# Patient Record
Sex: MALE | Race: WHITE | NOT HISPANIC OR LATINO | Employment: UNEMPLOYED | URBAN - METROPOLITAN AREA
[De-identification: names, ages, dates, MRNs, and addresses within clinical notes are randomized per-mention and may not be internally consistent; named-entity substitution may affect disease eponyms.]

---

## 2017-02-08 ENCOUNTER — OFFICE VISIT (OUTPATIENT)
Dept: NEUROSURGERY | Facility: CLINIC | Age: 67
End: 2017-02-08
Payer: COMMERCIAL

## 2017-02-08 VITALS
DIASTOLIC BLOOD PRESSURE: 85 MMHG | HEIGHT: 66 IN | SYSTOLIC BLOOD PRESSURE: 149 MMHG | WEIGHT: 180 LBS | HEART RATE: 79 BPM | BODY MASS INDEX: 28.93 KG/M2

## 2017-02-08 DIAGNOSIS — M54.41 LUMBAGO WITH SCIATICA, RIGHT SIDE: Primary | ICD-10-CM

## 2017-02-08 DIAGNOSIS — M54.2 CERVICALGIA: ICD-10-CM

## 2017-02-08 DIAGNOSIS — Z86.010 HX OF COLONIC POLYPS: ICD-10-CM

## 2017-02-08 PROCEDURE — 99215 OFFICE O/P EST HI 40 MIN: CPT | Mod: S$GLB,,, | Performed by: NEUROLOGICAL SURGERY

## 2017-02-08 RX ORDER — IBUPROFEN 200 MG
200 TABLET ORAL EVERY 6 HOURS PRN
Status: ON HOLD | COMMUNITY
End: 2017-08-18 | Stop reason: HOSPADM

## 2017-02-08 RX ORDER — ACETAMINOPHEN 325 MG/1
325 TABLET ORAL EVERY 6 HOURS PRN
COMMUNITY

## 2017-02-08 NOTE — MR AVS SNAPSHOT
[facility] Neurosurgery  [address]  [facility] [address]  Phone: [phone]  Fax: [phone]                  Tian Magana   2017 10:30 AM   Office Visit    Description:  Male : 1950   Provider:  Adam Acosta MD   Department:  [place] - Neurosurgery           Reason for Visit     Follow-up           Diagnoses this Visit        Comments    Lumbago with sciatica, right side    -  Primary     Hx of colonic polyps         Cervicalgia                To Do List           Goals (5 Years of Data)     None      [facility]     [facility] -  Assistance  Registered nurses in the [facility] provide clinical advisement, health education, appointment booking, and other advisory services.  Call for this free service at [phone].             Medications           Message regarding Medications     Verify the changes and/or additions to your medication regime listed below are the same as discussed with your clinician today.  If any of these changes or additions are incorrect, please notify your healthcare provider.             Verify that the below list of medications is an accurate representation of the medications you are currently taking.  If none reported, the list may be blank. If incorrect, please contact your healthcare provider. Carry this list with you in case of emergency.           Current Medications     acetaminophen (TYLENOL) 325 MG tablet Take 325 mg by mouth every 6 (six) hours as needed for Pain.    ascorbic acid (VITAMIN C) 100 MG tablet Take 100 mg by mouth once daily.    aspirin (ECOTRIN) 81 MG EC tablet Take 81 mg by mouth once daily.    atorvastatin (LIPITOR) 40 MG tablet Take 40 mg by mouth once daily.    calcium-vitamin D (OSCAL) 250 (625)-125 mg-unit per tablet Take 1 tablet by mouth once daily.    clonazePAM (KLONOPIN) 0.5 MG tablet Take 0.5 mg by mouth 2 (two) times daily as needed for Anxiety.    cyanocobalamin  "(VITAMIN B-12) 1000 MCG tablet Take 100 mcg by mouth once daily.    flunisolide 29 mcg (0.025 %) Spry by Nasal route.    ibuprofen (ADVIL) 200 MG tablet Take 200 mg by mouth every 6 (six) hours as needed for Pain.    lisinopril (PRINIVIL,ZESTRIL) 40 MG tablet Take 40 mg by mouth once daily.    loratadine (CLARITIN) 10 mg tablet Take 10 mg by mouth once daily.    omega-3 fatty acids-vitamin E 1,000 mg Cap Take by mouth.    ranitidine (ZANTAC) 150 MG tablet Take 150 mg by mouth 2 (two) times daily.    sertraline (ZOLOFT) 100 MG tablet Take 100 mg by mouth once daily.    sildenafil (VIAGRA) 100 MG tablet Take 100 mg by mouth daily as needed for Erectile Dysfunction.    simethicone (MYLICON) 80 MG chewable tablet Take 80 mg by mouth every 6 (six) hours as needed for Flatulence.    trazodone (DESYREL) 100 MG tablet Take 100 mg by mouth every evening.    gabapentin (NEURONTIN) 300 MG capsule Take 300 mg by mouth 3 (three) times daily.           Clinical Reference Information           Your Vitals Were     BP Pulse Height Weight BMI    149/85 79 5' 6" (1.676 m) 81.6 kg (180 lb) 29.05 kg/m2      Blood Pressure          Most Recent Value    BP  (!)  149/85      Allergies as of 2/8/2017     Sulfa (Sulfonamide Antibiotics)      Immunizations Administered on Date of Encounter - 2/8/2017     None      [facility] Sign-Up     Activating your [facility] account is as easy as 1-2-3!     1) Visit [email or web address], select Sign Up Now, enter this activation code and your date of birth, then select Next.  [email or web address]  Expires: 3/25/2017 12:40 PM      2) Create a username and password to use when you visit [facility] in the future and select a security question in case you lose your password and select Next.    3) Enter your e-mail address and click Sign Up!    Additional Information  If you have questions, please e-mail [email or web address] or call [phone] to talk to our [facility] staff. Remember, [facility] is NOT to be used for " urgent needs. For medical emergencies, dial 911.         Language Assistance Services     ATTENTION: Language assistance services are available, free of charge. Please call [phone].      ATENCIÓN: Si habla ibrahima, tiene a lipscomb disposición servicios gratuitos de asistencia lingüística. Llame al [phone].     CHÚ Ý: N?u b?n nói Ti?ng Vi?t, có các d?ch v? h? tr? ngôn ng? mi?n phí dành cho b?n. G?i s? [phone].         [facility] complies with applicable Federal civil rights laws and does not discriminate on the basis of race, color, national origin, age, disability, or sex.

## 2017-02-08 NOTE — PROGRESS NOTES
Neurosurgery History and Physical    Patient ID: Tian Magana is a 66 y.o. male.    Chief Complaint   Patient presents with    Follow-up     LBP f/u did not get injections. states no change in condition.      The patient is a 66 year old  male previously seen by my NP and by Dr Campos. He reports chronic LBP with intermittent flare-ups exacerbated by physical activity factors. He was advised to try MARLEN by Dr Campos but the patient decided not to pursue these as he has friends who suffered nerve injury from such injectio.    He has pain radiating down the right later leg to the mid calf. Separately he reports neck pain radiating to the left shoulder and to the left side of the occiput. EMG/NCT of the LUE showed some C5 radiculopathy and evidence and carpal and cubital tunnel syndrome. He has had occasional left finger numbness but this has not been an issue recently. No weakness or bowel/bladder dysfunction. He has tried PT with minimal benefit.         Review of Systems   Constitutional: Negative.    HENT: Negative.    Eyes: Negative.    Respiratory: Negative.    Cardiovascular: Negative.    Gastrointestinal: Negative.    Endocrine: Negative.    Genitourinary: Negative.    Musculoskeletal: Positive for back pain, myalgias, neck pain and neck stiffness.   Skin: Negative.    Allergic/Immunologic: Negative.    Neurological: Positive for numbness. Negative for weakness.   Hematological: Negative.    Psychiatric/Behavioral: Negative.        Past Medical History   Diagnosis Date    Anticoagulant long-term use      Aspirin    Hypertension      Social History     Social History    Marital status:      Spouse name: N/A    Number of children: N/A    Years of education: N/A     Occupational History    Not on file.     Social History Main Topics    Smoking status: Never Smoker    Smokeless tobacco: Never Used    Alcohol use 0.0 oz/week     0 Standard drinks or equivalent per week       Comment: 2-3 beers 4 times/week    Drug use: No    Sexual activity: Not on file     Other Topics Concern    Not on file     Social History Narrative     No family history on file.  Review of patient's allergies indicates:   Allergen Reactions    Sulfa (sulfonamide antibiotics)        Current Outpatient Prescriptions:     acetaminophen (TYLENOL) 325 MG tablet, Take 325 mg by mouth every 6 (six) hours as needed for Pain., Disp: , Rfl:     ascorbic acid (VITAMIN C) 100 MG tablet, Take 100 mg by mouth once daily., Disp: , Rfl:     aspirin (ECOTRIN) 81 MG EC tablet, Take 81 mg by mouth once daily., Disp: , Rfl:     atorvastatin (LIPITOR) 40 MG tablet, Take 40 mg by mouth once daily., Disp: , Rfl:     calcium-vitamin D (OSCAL) 250 (625)-125 mg-unit per tablet, Take 1 tablet by mouth once daily., Disp: , Rfl:     clonazePAM (KLONOPIN) 0.5 MG tablet, Take 0.5 mg by mouth 2 (two) times daily as needed for Anxiety., Disp: , Rfl:     cyanocobalamin (VITAMIN B-12) 1000 MCG tablet, Take 100 mcg by mouth once daily., Disp: , Rfl:     flunisolide 29 mcg (0.025 %) Spry, by Nasal route., Disp: , Rfl:     ibuprofen (ADVIL) 200 MG tablet, Take 200 mg by mouth every 6 (six) hours as needed for Pain., Disp: , Rfl:     lisinopril (PRINIVIL,ZESTRIL) 40 MG tablet, Take 40 mg by mouth once daily., Disp: , Rfl:     loratadine (CLARITIN) 10 mg tablet, Take 10 mg by mouth once daily., Disp: , Rfl:     omega-3 fatty acids-vitamin E 1,000 mg Cap, Take by mouth., Disp: , Rfl:     ranitidine (ZANTAC) 150 MG tablet, Take 150 mg by mouth 2 (two) times daily., Disp: , Rfl:     sertraline (ZOLOFT) 100 MG tablet, Take 100 mg by mouth once daily., Disp: , Rfl:     sildenafil (VIAGRA) 100 MG tablet, Take 100 mg by mouth daily as needed for Erectile Dysfunction., Disp: , Rfl:     simethicone (MYLICON) 80 MG chewable tablet, Take 80 mg by mouth every 6 (six) hours as needed for Flatulence., Disp: , Rfl:     trazodone (DESYREL) 100 MG  "tablet, Take 100 mg by mouth every evening., Disp: , Rfl:     gabapentin (NEURONTIN) 300 MG capsule, Take 300 mg by mouth 3 (three) times daily., Disp: , Rfl:   Blood pressure (!) 149/85, pulse 79, height 5' 6" (1.676 m), weight 81.6 kg (180 lb).      Neurologic Exam     Mental Status   Oriented to person, place, and time.   Attention: normal. Concentration: normal.   Speech: speech is normal   Level of consciousness: alert  Knowledge: good.     Cranial Nerves     CN III, IV, VI   Pupils are equal, round, and reactive to light.  Extraocular motions are normal.     CN V   Facial sensation intact.     CN VII   Facial expression full, symmetric.     CN VIII   Hearing: impaired    CN IX, X   Palate: symmetric    CN XI   CN XI normal.     CN XII   CN XII normal.     Motor Exam   Muscle bulk: normal  Overall muscle tone: normal  Right arm tone: normal  Left arm tone: normal  Right arm pronator drift: absent  Right leg tone: normal  Left leg tone: normal    Strength   Right neck flexion: 5/5  Left neck flexion: 5/5  Right neck extension: 5/5  Left neck extension: 5/5  Right deltoid: 5/5  Left deltoid: 5/5  Right biceps: 5/5  Left biceps: 5/5  Right triceps: 5/5  Left triceps: 5/5  Right wrist flexion: 5/5  Left wrist flexion: 5/5  Right wrist extension: 5/5  Left wrist extension: 5/5  Right interossei: 5/5  Left interossei: 5/5  Right abdominals: 5/5  Left abdominals: 5/5  Right iliopsoas: 5/5  Left iliopsoas: 5/5  Right quadriceps: 5/5  Left quadriceps: 5/5  Right hamstrin/5  Left hamstrin/5  Right glutei: 5/5  Left glutei: 5/5  Right anterior tibial: 5/5  Left anterior tibial: 5/5  Right posterior tibial: 5/5  Left posterior tibial: 5/5  Right peroneal: 5/5  Left peroneal: 5/5  Right gastroc: 5/5  Left gastroc: 5/5    Sensory Exam   Right arm light touch: normal  Left arm light touch: normal  Right leg light touch: decreased from knee (some numbness to LT in lateral femoral cutaneous distribution)  Left leg " light touch: normal  Right leg vibration: decreased from knee  Right leg proprioception: decreased from knee  Sensory deficit distribution on right: L3    Gait, Coordination, and Reflexes     Gait  Gait: normal    Coordination   Finger to nose coordination: normal    Tremor   Resting tremor: absent    Reflexes   Right brachioradialis: 2+  Left brachioradialis: 2+  Right biceps: 2+  Left biceps: 2+  Right triceps: 2+  Left triceps: 2+  Right patellar: 2+  Left patellar: 2+  Right achilles: 2+  Left achilles: 2+  Right : 2+  Left : 2+  Right plantar: normal  Left plantar: normal  Right Menard: absent  Left Menard: absent  Right ankle clonus: absent  Left ankle clonus: absent      Physical Exam   Constitutional: He is oriented to person, place, and time. He appears well-developed and well-nourished.   HENT:   Head: Normocephalic and atraumatic.   Eyes: EOM are normal. Pupils are equal, round, and reactive to light.   Neck: Neck supple.   Cardiovascular: Normal rate and regular rhythm.    Pulmonary/Chest: Effort normal.   Abdominal: Soft.   Musculoskeletal: Normal range of motion.   Neurological: He is alert and oriented to person, place, and time. He has a normal Finger-Nose-Finger Test. Gait normal.   Reflex Scores:       Tricep reflexes are 2+ on the right side and 2+ on the left side.       Bicep reflexes are 2+ on the right side and 2+ on the left side.       Brachioradialis reflexes are 2+ on the right side and 2+ on the left side.       Patellar reflexes are 2+ on the right side and 2+ on the left side.       Achilles reflexes are 2+ on the right side and 2+ on the left side.  Skin: Skin is warm and dry.   Psychiatric: He has a normal mood and affect. His speech is normal and behavior is normal. Judgment and thought content normal.   Vitals reviewed.      Provider dictation:    The patient has continued pain rated 7/10 and he only has mild sensory loss. I have reviewed again his MRIs of the C and L  spines from July 2016. He has mild spondylotic changes but no clear compression of the neral elements. The lumbar MRI shows congenital narrowing of the spinal canal, with ligamentous and synovial hypertrophy causing multilevel stenosis at worse at L4-L5 and L5-S1.There is an acute far lateral annular tear at L3-L4 on the right.   His back pain is causing moderate impairment.    His current level of impairment would justify decompression and fusion but his mild deficits make the surgery non-urgent. We will offer surgery if his symptoms worsen.    Visit Diagnosis:  Lumbago with sciatica, right side    Hx of colonic polyps    Cervicalgia

## 2017-07-26 DIAGNOSIS — M48.061 LUMBAR STENOSIS: Primary | ICD-10-CM

## 2017-07-26 RX ORDER — SODIUM CHLORIDE 9 MG/ML
INJECTION, SOLUTION INTRAVENOUS CONTINUOUS
Status: CANCELLED | OUTPATIENT
Start: 2017-07-26

## 2017-08-15 PROBLEM — M48.061 LUMBAR STENOSIS: Status: ACTIVE | Noted: 2017-08-15

## 2017-08-28 ENCOUNTER — CLINICAL SUPPORT (OUTPATIENT)
Dept: NEUROSURGERY | Facility: CLINIC | Age: 67
End: 2017-08-28
Payer: COMMERCIAL

## 2017-08-28 DIAGNOSIS — Z98.1 STATUS POST LUMBAR SPINAL FUSION: Primary | ICD-10-CM

## 2017-08-28 NOTE — PROGRESS NOTES
Pt is 12 days s/p L3-S1 PSIF with Dr. Acosta. No s/s of infection. Incision cleaned with chloraprep and staples removed with no issue. Incision is warm, dry, intact. Pt reports post-operative pain level < pre-operative state. Pt is requesting medication refill today. Refilled on paper prescription by Dr. Acosta, Pt re-educated on narcotics policy. Educated patient on weight lifting status, bending/lifting/twisting, and to call with any changes or questions. Pt aware of imaging and f/u appt with provider. No further questions.

## 2017-09-13 ENCOUNTER — OFFICE VISIT (OUTPATIENT)
Dept: SPINE | Facility: CLINIC | Age: 67
End: 2017-09-13
Payer: COMMERCIAL

## 2017-09-13 ENCOUNTER — HOSPITAL ENCOUNTER (OUTPATIENT)
Dept: RADIOLOGY | Facility: HOSPITAL | Age: 67
Discharge: HOME | End: 2017-09-13
Attending: NEUROLOGICAL SURGERY
Payer: COMMERCIAL

## 2017-09-13 VITALS
HEART RATE: 72 BPM | SYSTOLIC BLOOD PRESSURE: 140 MMHG | DIASTOLIC BLOOD PRESSURE: 81 MMHG | WEIGHT: 176.94 LBS | BODY MASS INDEX: 28.44 KG/M2 | HEIGHT: 66 IN

## 2017-09-13 DIAGNOSIS — Z98.1 S/P LUMBAR FUSION: ICD-10-CM

## 2017-09-13 DIAGNOSIS — Z09 POSTOPERATIVE EXAMINATION: Primary | ICD-10-CM

## 2017-09-13 DIAGNOSIS — Z98.1 STATUS POST LUMBAR SPINAL FUSION: ICD-10-CM

## 2017-09-13 PROCEDURE — 72100 X-RAY EXAM L-S SPINE 2/3 VWS: CPT | Mod: TC,PO

## 2017-09-13 PROCEDURE — 99213 OFFICE O/P EST LOW 20 MIN: CPT | Mod: PBBFAC,25,PN | Performed by: PHYSICIAN ASSISTANT

## 2017-09-13 PROCEDURE — 99999 PR PBB SHADOW E&M-EST. PATIENT-LVL III: CPT | Mod: PBBFAC,,, | Performed by: PHYSICIAN ASSISTANT

## 2017-09-13 PROCEDURE — 72100 X-RAY EXAM L-S SPINE 2/3 VWS: CPT | Mod: 26,,, | Performed by: RADIOLOGY

## 2017-09-13 PROCEDURE — 99024 POSTOP FOLLOW-UP VISIT: CPT | Mod: ,,, | Performed by: PHYSICIAN ASSISTANT

## 2017-09-13 NOTE — PROGRESS NOTES
Neurosurgery History & Physical    Patient ID: Tian Magana is a 66 y.o. male.    Chief Complaint   Patient presents with    Post-op Evaluation       Review of Systems   Constitutional: Negative for activity change, chills, fatigue and unexpected weight change.   HENT: Negative for hearing loss, tinnitus, trouble swallowing and voice change.    Eyes: Negative for visual disturbance.   Respiratory: Negative for apnea, chest tightness and shortness of breath.    Cardiovascular: Negative for chest pain and palpitations.   Gastrointestinal: Negative for abdominal pain, constipation, diarrhea, nausea and vomiting.   Genitourinary: Negative for difficulty urinating, dysuria and frequency.   Musculoskeletal: Positive for back pain. Negative for gait problem, neck pain and neck stiffness.   Skin: Negative for wound.   Neurological: Negative for dizziness, tremors, seizures, facial asymmetry, speech difficulty, weakness, light-headedness, numbness and headaches.   Psychiatric/Behavioral: Negative for confusion and decreased concentration.       Past Medical History:   Diagnosis Date    Anticoagulant long-term use     Aspirin    Chronic back pain     GERD (gastroesophageal reflux disease)     H/O seasonal allergies     Hypercholesteremia     Hypertension     Lumbar stenosis      Social History     Social History    Marital status:      Spouse name: N/A    Number of children: N/A    Years of education: N/A     Occupational History    Not on file.     Social History Main Topics    Smoking status: Never Smoker    Smokeless tobacco: Never Used    Alcohol use 0.0 oz/week      Comment: 2-3 beers 4 times/week    Drug use: No    Sexual activity: Not on file     Other Topics Concern    Not on file     Social History Narrative    No narrative on file     Family History   Problem Relation Age of Onset    Parkinsonism Mother     Hypertension Mother     Lung cancer Father      Review of patient's  allergies indicates:   Allergen Reactions    Sulfa (sulfonamide antibiotics)      As a child, told by mother of allergy, unknown reaction       Current Outpatient Prescriptions:     acetaminophen (TYLENOL) 325 MG tablet, Take 325 mg by mouth every 6 (six) hours as needed for Pain., Disp: , Rfl:     ascorbic acid (VITAMIN C) 100 MG tablet, Take 100 mg by mouth once daily., Disp: , Rfl:     atorvastatin (LIPITOR) 40 MG tablet, Take 40 mg by mouth once daily., Disp: , Rfl:     calcium-vitamin D (OSCAL) 250 (625)-125 mg-unit per tablet, Take 1 tablet by mouth once daily., Disp: , Rfl:     clonazePAM (KLONOPIN) 0.5 MG tablet, Take 0.5 mg by mouth 2 (two) times daily. , Disp: , Rfl:     gabapentin (NEURONTIN) 300 MG capsule, Take 300 mg by mouth 3 (three) times daily as needed. , Disp: , Rfl:     lisinopril (PRINIVIL,ZESTRIL) 40 MG tablet, Take 40 mg by mouth once daily., Disp: , Rfl:     loratadine (CLARITIN) 10 mg tablet, Take 10 mg by mouth daily as needed. , Disp: , Rfl:     omega-3 fatty acids-vitamin E 1,000 mg Cap, Take 1 capsule by mouth once daily. , Disp: , Rfl:     oxycodone-acetaminophen (PERCOCET) 7.5-325 mg per tablet, Take 1 tablet by mouth every 4 to 6 hours as needed., Disp: 61 tablet, Rfl: 0    ranitidine (ZANTAC) 150 MG tablet, Take 150 mg by mouth 2 (two) times daily as needed. , Disp: , Rfl:     sertraline (ZOLOFT) 100 MG tablet, Take 100 mg by mouth once daily., Disp: , Rfl:     sildenafil (VIAGRA) 100 MG tablet, Take 100 mg by mouth daily as needed for Erectile Dysfunction., Disp: , Rfl:     simethicone (MYLICON) 80 MG chewable tablet, Take 80 mg by mouth every 6 (six) hours as needed for Flatulence., Disp: , Rfl:     trazodone (DESYREL) 100 MG tablet, Take 100 mg by mouth every evening., Disp: , Rfl:     Vitals:    09/13/17 1037   BP: (!) 140/81   BP Location: Right arm   Patient Position: Sitting   BP Method: Large (Automatic)   Pulse: 72   Weight: 80.3 kg (176 lb 14.7 oz)  "  Height: 5' 6" (1.676 m)       Physical Exam   Constitutional: He is oriented to person, place, and time. He appears well-developed and well-nourished.   HENT:   Head: Normocephalic and atraumatic.   Eyes: Pupils are equal, round, and reactive to light.   Neck: Normal range of motion. Neck supple.   Cardiovascular: Normal rate.    Pulmonary/Chest: Effort normal.   Abdominal: He exhibits no distension.   Musculoskeletal: Normal range of motion. He exhibits no edema.   Neurological: He is alert and oriented to person, place, and time. He has a normal Finger-Nose-Finger Test, a normal Heel to Shin Test, a normal Romberg Test and a normal Tandem Gait Test. Gait normal.   Reflex Scores:       Tricep reflexes are 2+ on the right side and 2+ on the left side.       Bicep reflexes are 2+ on the right side and 2+ on the left side.       Brachioradialis reflexes are 2+ on the right side and 2+ on the left side.       Patellar reflexes are 2+ on the right side and 2+ on the left side.       Achilles reflexes are 2+ on the right side and 2+ on the left side.  Skin: Skin is warm and dry.   Psychiatric: He has a normal mood and affect. His speech is normal and behavior is normal. Judgment and thought content normal.   Nursing note and vitals reviewed.      Neurologic Exam     Mental Status   Oriented to person, place, and time.   Oriented to person.   Oriented to place.   Oriented to time.   Follows 3 step commands.   Attention: normal. Concentration: normal.   Speech: speech is normal   Level of consciousness: alert  Knowledge: consistent with education.   Able to name object. Able to read. Able to repeat. Able to write. Normal comprehension.     Cranial Nerves     CN II   Visual acuity: normal  Right visual field deficit: none  Left visual field deficit: none     CN III, IV, VI   Pupils are equal, round, and reactive to light.  Right pupil: Size: 3 mm. Shape: regular. Reactivity: brisk. Consensual response: intact.   Left " pupil: Size: 3 mm. Shape: regular. Reactivity: brisk. Consensual response: intact.   CN III: no CN III palsy  CN VI: no CN VI palsy  Nystagmus: none   Diplopia: none  Ophthalmoparesis: none  Conjugate gaze: present    CN V   Right facial sensation deficit: none  Left facial sensation deficit: none    CN VII   Right facial weakness: none  Left facial weakness: none    CN VIII   Hearing: intact    CN IX, X   CN IX normal.   CN X normal.     CN XI   Right sternocleidomastoid strength: normal  Left sternocleidomastoid strength: normal  Right trapezius strength: normal  Left trapezius strength: normal    CN XII   Fasciculations: absent  Tongue deviation: none    Motor Exam   Muscle bulk: normal  Overall muscle tone: normal  Right arm pronator drift: absent  Left arm pronator drift: absent    Strength   Right neck flexion: 5/5  Left neck flexion: 5/5  Right neck extension: 5/5  Left neck extension: 5/5  Right deltoid: 5/5  Left deltoid: 5/5  Right biceps: 5/5  Left biceps: 5/5  Right triceps: 5/5  Left triceps: 5/5  Right wrist flexion: 5/5  Left wrist flexion: 5/5  Right wrist extension: 5/5  Left wrist extension: 5/5  Right interossei: 5/5  Left interossei: 5/5  Right abdominals: 5/5  Left abdominals: 5/5  Right iliopsoas: 5/5  Left iliopsoas: 5/5  Right quadriceps: 5/5  Left quadriceps: 5/5  Right hamstrin/5  Left hamstrin/5  Right glutei: 5/5  Left glutei: 5/5  Right anterior tibial: 5/5  Left anterior tibial: 5/5  Right posterior tibial: 5/5  Left posterior tibial: 5/5  Right peroneal: 5/5  Left peroneal: 5/5  Right gastroc: 5/5  Left gastroc: 5/5    Sensory Exam   Right arm light touch: normal  Left arm light touch: normal  Right leg light touch: normal  Left leg light touch: normal  Right arm vibration: normal  Left arm vibration: normal  Right arm pinprick: normal  Left arm pinprick: normal    Gait, Coordination, and Reflexes     Gait  Gait: normal    Coordination   Romberg: negative  Finger to nose  coordination: normal  Heel to shin coordination: normal  Tandem walking coordination: normal    Tremor   Resting tremor: absent  Intention tremor: absent  Action tremor: absent    Reflexes   Right brachioradialis: 2+  Left brachioradialis: 2+  Right biceps: 2+  Left biceps: 2+  Right triceps: 2+  Left triceps: 2+  Right patellar: 2+  Left patellar: 2+  Right achilles: 2+  Left achilles: 2+  Right Menard: absent  Left Menard: absent  Right ankle clonus: absent  Left ankle clonus: absent      Provider dictation:  The patient is a 66 year-old male  following up 4 weeks s/p 8/15/17 L3 to S1 PSIF. he recovered as expected post-operatively and reports intermittent, mild lower back pain. his RLE symptoms have improved with significant reduction in radicular leg pain.  He has some right anterior thigh numbness that is still present, but improving.  He is known to have knee arthritis and is having some knee pain at this time.      Post-op x-ray reveal spinal stability and appropriate hardware alignment without change from immediate post-op films.       He has been wearing the lumbar corset as ordered and we would like for him  to wean use of the brace over the next 2 weeks.     Overall he is very pleased with his surgical outcome. We will follow-up via phone at 12 weeks post-op to see how He is coming along.       Visit Diagnosis:  Postoperative examination    S/P lumbar fusion        Total time spent counseling greater than fifty percent of total visit time.  Counseling included discussion regarding imaging findings, diagnosis possibilities, treatment options, risks and benefits.   The patient had many questions regarding the options and long-term effects.

## 2018-02-16 ENCOUNTER — TELEPHONE (OUTPATIENT)
Dept: NEUROSURGERY | Facility: CLINIC | Age: 68
End: 2018-02-16

## 2018-02-16 NOTE — TELEPHONE ENCOUNTER
Low Back Pain Disability Questionnaire    1. Pain Intensity:    0 - I have no pain at the moment.    2. Personal Care (washing, dressing, etc.):    0 - I can look after myself without causing extra pain.    3. Liftin - I can lift heavy weights without extra pain.    4. Walkin - Pain does not prevent me walking any distance.    5. Sittin - I can sit in any chair as long as I like.    6. Standin - Pain prevents me from standing for more than 1 hour.     7. Sleepin - My sleep is slightly disturbed (less than 1 hour sleepless).     8. Sex Life:     0. My sex life is normal and causes no extra pain  1. My sex life is normal but causes some extra pain  2. My sex life is normal but is very painful  3. My sex life is severely restricted by pain  4. My sex life is nearly absent because of pain  5. Pain prevents any sex life at all    9. Social Life:    0 - My social life is normal and does not increase my pain.     10. Travellin - My pain restricts my travel over 2 hours.         Patient's Total Score:  10%       Scoring instructions   For each section the total possible score is 5: if the first statement is marked the section score = 0; if the last statement is marked, it = 5. If all 10 sections are completed the score is calculated as follows:     Example: 16 (total scored)   50 (total possible score) x 100 = 32%     If one section is missed or not applicable the score is calculated:   16 (total scored)   45 (total possible score) x 100 = 35.5%   Minimum detectable change (90% confidence): 10% points (change of less than this may be attributable to error in the measurement)       Interpretation of scores 0% to 20%: minimal disability:  The patient can cope with most living activities. Usually no treatment is indicated apart from advice on lifting sitting and exercise.    21%-40%: moderate disability:  The patient experiences more pain and difficulty with sitting, lifting and  standing. Travel and social life are more difficult and they may be disabled from work. Personal care, sexual activity and sleeping are not grossly affected and the patient can usually be managed by conservative means.    41%-60%: severe disability:  Pain remains the main problem in this group but activities of daily living are affected. These patients require a detailed investigation.    61%-80%: crippled:  Back pain impinges on all aspects of the patient's life. Positive intervention is required.    81%-100%:  These patients are either bed-bound or exaggerating their symptoms.            ----- Message from Susan Donahue LPN sent at 2/16/2018 10:41 AM CST -----  Regarding: FW: 3 month phone follow up      ----- Message -----  From: Susan Donahue LPN  Sent: 7/26/2017   4:05 PM  To: Susan Donahue LPN  Subject: 3 month phone follow up

## 2018-07-20 ENCOUNTER — OFFICE VISIT (OUTPATIENT)
Dept: SPINE | Facility: CLINIC | Age: 68
End: 2018-07-20
Payer: OTHER GOVERNMENT

## 2018-07-20 VITALS
WEIGHT: 183.19 LBS | SYSTOLIC BLOOD PRESSURE: 120 MMHG | DIASTOLIC BLOOD PRESSURE: 70 MMHG | HEART RATE: 61 BPM | BODY MASS INDEX: 29.44 KG/M2 | HEIGHT: 66 IN

## 2018-07-20 DIAGNOSIS — M47.892 OTHER OSTEOARTHRITIS OF SPINE, CERVICAL REGION: Primary | ICD-10-CM

## 2018-07-20 DIAGNOSIS — M54.2 CERVICALGIA: ICD-10-CM

## 2018-07-20 PROCEDURE — 99213 OFFICE O/P EST LOW 20 MIN: CPT | Mod: S$PBB,,, | Performed by: PHYSICIAN ASSISTANT

## 2018-07-20 PROCEDURE — 99215 OFFICE O/P EST HI 40 MIN: CPT | Mod: PBBFAC,PN | Performed by: PHYSICIAN ASSISTANT

## 2018-07-20 PROCEDURE — 99999 PR PBB SHADOW E&M-EST. PATIENT-LVL V: CPT | Mod: PBBFAC,,, | Performed by: PHYSICIAN ASSISTANT

## 2018-07-20 RX ORDER — ERGOCALCIFEROL 1.25 MG/1
50000 CAPSULE ORAL
COMMUNITY

## 2018-07-20 RX ORDER — LIDOCAINE 50 MG/G
1 PATCH TOPICAL
COMMUNITY

## 2018-07-20 RX ORDER — ASPIRIN 81 MG/1
81 TABLET ORAL DAILY
COMMUNITY

## 2018-07-20 RX ORDER — HYDROXYZINE HYDROCHLORIDE 10 MG/1
25 TABLET, FILM COATED ORAL NIGHTLY
COMMUNITY

## 2018-07-20 NOTE — PROGRESS NOTES
Neurosurgery History & Physical    Patient ID: Tian Magana is a 67 y.o. male.    Chief Complaint   Patient presents with    Neck Pain     Has had pain in the neck since 2016. Pain radiates down into left shoulder and up into head on left side. Nothing seems to make pain better. Pain is constant. He gets cramps in the neck. Turning head to the left makes pain worse.       Review of Systems   Constitutional: Negative for activity change, chills, fatigue and unexpected weight change.   HENT: Negative for hearing loss, tinnitus, trouble swallowing and voice change.    Eyes: Negative for visual disturbance.   Respiratory: Negative for apnea, chest tightness and shortness of breath.    Cardiovascular: Negative for chest pain and palpitations.   Gastrointestinal: Negative for abdominal pain, constipation, diarrhea, nausea and vomiting.   Genitourinary: Negative for difficulty urinating, dysuria and frequency.   Musculoskeletal: Positive for myalgias and neck pain. Negative for gait problem and neck stiffness.   Skin: Negative for wound.   Neurological: Negative for dizziness, tremors, seizures, facial asymmetry, speech difficulty, weakness, light-headedness, numbness and headaches.   Psychiatric/Behavioral: Negative for confusion and decreased concentration.       Past Medical History:   Diagnosis Date    Anticoagulant long-term use     Aspirin    Chronic back pain     GERD (gastroesophageal reflux disease)     H/O seasonal allergies     Hypercholesteremia     Hypertension     Lumbar stenosis      Social History     Social History    Marital status:      Spouse name: N/A    Number of children: N/A    Years of education: N/A     Occupational History    Not on file.     Social History Main Topics    Smoking status: Never Smoker    Smokeless tobacco: Never Used    Alcohol use 0.0 oz/week      Comment: 2-3 beers 4 times/week    Drug use: No    Sexual activity: Not on file     Other Topics Concern     Not on file     Social History Narrative    No narrative on file     Family History   Problem Relation Age of Onset    Parkinsonism Mother     Hypertension Mother     Lung cancer Father      Review of patient's allergies indicates:   Allergen Reactions    Sulfa (sulfonamide antibiotics)      As a child, told by mother of allergy, unknown reaction       Current Outpatient Prescriptions:     acetaminophen (TYLENOL) 325 MG tablet, Take 325 mg by mouth every 6 (six) hours as needed for Pain., Disp: , Rfl:     ascorbic acid (VITAMIN C) 100 MG tablet, Take 100 mg by mouth once daily., Disp: , Rfl:     aspirin (ECOTRIN) 81 MG EC tablet, Take 81 mg by mouth once daily., Disp: , Rfl:     atorvastatin (LIPITOR) 40 MG tablet, Take 40 mg by mouth once daily., Disp: , Rfl:     calcium-vitamin D (OSCAL) 250 (625)-125 mg-unit per tablet, Take 1 tablet by mouth once daily., Disp: , Rfl:     clonazePAM (KLONOPIN) 0.5 MG tablet, Take 0.5 mg by mouth 2 (two) times daily. , Disp: , Rfl:     ergocalciferol (ERGOCALCIFEROL) 50,000 unit Cap, Take 50,000 Units by mouth every 7 days., Disp: , Rfl:     gabapentin (NEURONTIN) 300 MG capsule, Take 300 mg by mouth 3 (three) times daily as needed. , Disp: , Rfl:     hydrOXYzine HCl (ATARAX) 10 MG Tab, Take 25 mg by mouth every evening., Disp: , Rfl:     lidocaine (LIDODERM) 5 %, Place 1 patch onto the skin every 24 hours. Remove & Discard patch within 12 hours or as directed by MD, Disp: , Rfl:     lisinopril (PRINIVIL,ZESTRIL) 40 MG tablet, Take 40 mg by mouth once daily., Disp: , Rfl:     loratadine (CLARITIN) 10 mg tablet, Take 10 mg by mouth daily as needed. , Disp: , Rfl:     omega-3 fatty acids-vitamin E 1,000 mg Cap, Take 1 capsule by mouth once daily. , Disp: , Rfl:     oxycodone-acetaminophen (PERCOCET) 7.5-325 mg per tablet, Take 1 tablet by mouth every 4 to 6 hours as needed., Disp: 61 tablet, Rfl: 0    ranitidine (ZANTAC) 150 MG tablet, Take 150 mg by mouth 2  "(two) times daily as needed. , Disp: , Rfl:     sertraline (ZOLOFT) 100 MG tablet, Take 100 mg by mouth once daily., Disp: , Rfl:     sildenafil (VIAGRA) 100 MG tablet, Take 100 mg by mouth daily as needed for Erectile Dysfunction., Disp: , Rfl:     simethicone (MYLICON) 80 MG chewable tablet, Take 80 mg by mouth every 6 (six) hours as needed for Flatulence., Disp: , Rfl:     trazodone (DESYREL) 100 MG tablet, Take 100 mg by mouth every evening., Disp: , Rfl:     Vitals:    07/20/18 1059   BP: 120/70   Pulse: 61   Weight: 83.1 kg (183 lb 3.2 oz)   Height: 5' 6" (1.676 m)       Physical Exam   Constitutional: He is oriented to person, place, and time. He appears well-developed and well-nourished.   HENT:   Head: Normocephalic and atraumatic.   Eyes: Pupils are equal, round, and reactive to light.   Neck: Normal range of motion. Neck supple.   Cardiovascular: Normal rate.    Pulmonary/Chest: Effort normal.   Abdominal: He exhibits no distension.   Musculoskeletal: Normal range of motion. He exhibits no edema.   Neurological: He is alert and oriented to person, place, and time. He has a normal Finger-Nose-Finger Test, a normal Heel to Shin Test, a normal Romberg Test and a normal Tandem Gait Test. Gait normal.   Reflex Scores:       Tricep reflexes are 2+ on the right side and 2+ on the left side.       Bicep reflexes are 2+ on the right side and 2+ on the left side.       Brachioradialis reflexes are 2+ on the right side and 2+ on the left side.       Patellar reflexes are 2+ on the right side and 2+ on the left side.       Achilles reflexes are 2+ on the right side and 2+ on the left side.  Skin: Skin is warm and dry.   Psychiatric: He has a normal mood and affect. His speech is normal and behavior is normal. Judgment and thought content normal.   Nursing note and vitals reviewed.      Neurologic Exam     Mental Status   Oriented to person, place, and time.   Oriented to person.   Oriented to place.   Oriented to " time.   Follows 3 step commands.   Attention: normal. Concentration: normal.   Speech: speech is normal   Level of consciousness: alert  Knowledge: consistent with education.   Able to name object. Able to read. Able to repeat. Able to write. Normal comprehension.     Cranial Nerves     CN II   Visual acuity: normal  Right visual field deficit: none  Left visual field deficit: none     CN III, IV, VI   Pupils are equal, round, and reactive to light.  Right pupil: Size: 3 mm. Shape: regular. Reactivity: brisk. Consensual response: intact.   Left pupil: Size: 3 mm. Shape: regular. Reactivity: brisk. Consensual response: intact.   CN III: no CN III palsy  CN VI: no CN VI palsy  Nystagmus: none   Diplopia: none  Ophthalmoparesis: none  Conjugate gaze: present    CN V   Right facial sensation deficit: none  Left facial sensation deficit: none    CN VII   Right facial weakness: none  Left facial weakness: none    CN VIII   Hearing: intact    CN IX, X   CN IX normal.   CN X normal.     CN XI   Right sternocleidomastoid strength: normal  Left sternocleidomastoid strength: normal  Right trapezius strength: normal  Left trapezius strength: normal    CN XII   Fasciculations: absent  Tongue deviation: none    Motor Exam   Muscle bulk: normal  Overall muscle tone: normal  Right arm pronator drift: absent  Left arm pronator drift: absent    Strength   Right neck flexion: 5/5  Left neck flexion: 5/5  Right neck extension: 5/5  Left neck extension: 5/5  Right deltoid: 5/5  Left deltoid: 5/5  Right biceps: 5/5  Left biceps: 5/5  Right triceps: 5/5  Left triceps: 5/5  Right wrist flexion: 5/5  Left wrist flexion: 5/5  Right wrist extension: 5/5  Left wrist extension: 5/5  Right interossei: 5/5  Left interossei: 5/5  Right abdominals: 5/5  Left abdominals: 5/5  Right iliopsoas: 5/5  Left iliopsoas: 5/5  Right quadriceps: 5/5  Left quadriceps: 5/5  Right hamstrin/5  Left hamstrin/5  Right glutei: 5/5  Left glutei: 5/5  Right  anterior tibial: 5/5  Left anterior tibial: 5/5  Right posterior tibial: 5/5  Left posterior tibial: 5/5  Right peroneal: 5/5  Left peroneal: 5/5  Right gastroc: 5/5  Left gastroc: 5/5    Sensory Exam   Right arm light touch: normal  Left arm light touch: normal  Right leg light touch: normal  Left leg light touch: normal  Right arm vibration: normal  Left arm vibration: normal  Right arm pinprick: normal  Left arm pinprick: normal    Gait, Coordination, and Reflexes     Gait  Gait: normal    Coordination   Romberg: negative  Finger to nose coordination: normal  Heel to shin coordination: normal  Tandem walking coordination: normal    Tremor   Resting tremor: absent  Intention tremor: absent  Action tremor: absent    Reflexes   Right brachioradialis: 2+  Left brachioradialis: 2+  Right biceps: 2+  Left biceps: 2+  Right triceps: 2+  Left triceps: 2+  Right patellar: 2+  Left patellar: 2+  Right achilles: 2+  Left achilles: 2+  Right Menard: absent  Left Menard: absent  Right ankle clonus: absent  Left ankle clonus: absent      Provider dictation:  The patient is a 66 year-old male  s/p 8/15/17 L3 to S1 PSIF by Dr. Acosta with good results.  He presents today to discuss neck pain. He has had a prior MRI and EMG/ NCV of the bilateral upper extremities. He continues to have pain in the left paracervical region that radiates along the left trapezius from the base of the skull to the left shoulder.  He denies radicular pain/ numbness/ tingling into the upper extremities.   He has taken ibuprofen for pain and has completed PT for the neck in the past.  He has not had MARLEN and has been reluctant to consider due to poor results from family members/ friends.     On exam, he is neurologically intact with 5/5 strength throughout and 2+ muscle stretch reflexes in the upper and lower extremities.  His gait and station are fluid.     I have reviewed an MRI of the cervical spine without contrast from [facility] 7-25-16:  C3/4 and C4/5  broad disc/ osteophyte with moderate bilateral foraminal narrowing.  C5/6 disk/ osteophyte with right foraminal narrowing.     EMG/ NCV by Dr. Canela 9-8-16 revealed:  Subacute left C5 radiculopathy, moderate left carpal tunnel and evidence of left cubital tunnel syndrome.     65 year old male with cervicalgia and possible left C4 radiculopathy.  He has cervical spondylosis at C3/4 and C4/5 with bilateral foraminal narrowing and no right sided symptoms.  Nerve testing revealed subacute left C5 radiculopathy, but no C4 radiculopathy.  He states he discussed cervical spine surgery with Dr. Acosta prior to his lumbar surgery and he wants to discuss further him.  (Dr. Acosta's note from prior to surgery, did not give recommendation for or against cervical spine surgery.)  We will obtain an updated MRI cervical spine and arrange for him to see Dr. Acosta.      Visit Diagnosis:  Other osteoarthritis of spine, cervical region  -     MRI Cervical Spine Without Contrast; Future; Expected date: 07/20/2018    Cervicalgia  -     MRI Cervical Spine Without Contrast; Future; Expected date: 07/20/2018        Total time spent counseling greater than fifty percent of total visit time.  Counseling included discussion regarding imaging findings, diagnosis possibilities, treatment options, risks and benefits.   The patient had many questions regarding the options and long-term effects.

## 2022-10-31 ENCOUNTER — OFFICE VISIT (OUTPATIENT)
Dept: GASTROENTEROLOGY | Facility: CLINIC | Age: 72
End: 2022-10-31
Payer: OTHER GOVERNMENT

## 2022-10-31 VITALS — HEIGHT: 66 IN | BODY MASS INDEX: 30.16 KG/M2 | WEIGHT: 187.63 LBS

## 2022-10-31 DIAGNOSIS — Z87.19 HISTORY OF GASTROESOPHAGEAL REFLUX (GERD): ICD-10-CM

## 2022-10-31 DIAGNOSIS — Z90.49 S/P CHOLECYSTECTOMY: ICD-10-CM

## 2022-10-31 DIAGNOSIS — Z86.010 HISTORY OF COLON POLYPS: Primary | ICD-10-CM

## 2022-10-31 PROCEDURE — 99203 PR OFFICE/OUTPT VISIT, NEW, LEVL III, 30-44 MIN: ICD-10-PCS | Mod: S$PBB,,, | Performed by: NURSE PRACTITIONER

## 2022-10-31 PROCEDURE — 99999 PR PBB SHADOW E&M-EST. PATIENT-LVL V: CPT | Mod: PBBFAC,,, | Performed by: NURSE PRACTITIONER

## 2022-10-31 PROCEDURE — 99999 PR PBB SHADOW E&M-EST. PATIENT-LVL V: ICD-10-PCS | Mod: PBBFAC,,, | Performed by: NURSE PRACTITIONER

## 2022-10-31 PROCEDURE — 99215 OFFICE O/P EST HI 40 MIN: CPT | Mod: PBBFAC,PO | Performed by: NURSE PRACTITIONER

## 2022-10-31 PROCEDURE — 99203 OFFICE O/P NEW LOW 30 MIN: CPT | Mod: S$PBB,,, | Performed by: NURSE PRACTITIONER

## 2022-10-31 RX ORDER — FAMOTIDINE 40 MG/1
40 TABLET, FILM COATED ORAL
COMMUNITY
Start: 2021-12-21

## 2022-10-31 RX ORDER — LANOLIN ALCOHOL/MO/W.PET/CERES
1000 CREAM (GRAM) TOPICAL
COMMUNITY
Start: 2022-03-04

## 2022-10-31 RX ORDER — CARBOXYMETHYLCELLULOSE SODIUM 5 MG/ML
SOLUTION/ DROPS OPHTHALMIC
COMMUNITY
Start: 2021-12-21

## 2022-10-31 RX ORDER — ROSUVASTATIN CALCIUM 40 MG/1
40 TABLET, COATED ORAL
COMMUNITY
Start: 2021-12-21

## 2022-10-31 RX ORDER — CALCIUM CARBONATE 500(1250)
1250 TABLET ORAL
COMMUNITY
Start: 2022-03-04

## 2022-10-31 RX ORDER — MAGNESIUM OXIDE 420 MG/1
TABLET ORAL
COMMUNITY
Start: 2022-03-04

## 2022-10-31 RX ORDER — CHOLECALCIFEROL (VITAMIN D3) 50 MCG
CAPSULE ORAL
COMMUNITY

## 2022-10-31 RX ORDER — FLUTICASONE PROPIONATE 50 MCG
SPRAY, SUSPENSION (ML) NASAL
COMMUNITY

## 2022-10-31 RX ORDER — METFORMIN HYDROCHLORIDE 500 MG/1
1 TABLET, EXTENDED RELEASE ORAL DAILY
COMMUNITY
Start: 2022-07-07

## 2022-10-31 NOTE — PROGRESS NOTES
Subjective:       Patient ID: Tian Magana is a 72 y.o. male, Body mass index is 30.28 kg/m².    Chief Complaint: Colonoscopy      Patient is new to me. Established patient of Dr. Lowe.  [facility] of [facility].     GI Problem  Primary symptoms do not include fever, weight loss, fatigue, abdominal pain, nausea, vomiting, diarrhea, melena, hematemesis, jaundice, hematochezia, dysuria, myalgias, arthralgias or rash.   The illness does not include chills, anorexia, dysphagia, odynophagia, bloating or constipation. Associated symptoms comments: [facility] for personal history of colonic polyps. Last c-scope done 2/1/16; polyp removed and internal hemorrhoids, repeat recommended in five years. Denies family history of colon cancer. Feeling well, no complaints. Significant associated medical issues include GERD (Hx of GERD- well controlled taking Famotidine 40 mg once daily PRN), gallstones (Hx of cholecystectomy) and hemorrhoids. Associated medical issues do not include inflammatory bowel disease, liver disease, alcohol abuse, PUD, gastric bypass, bowel resection, irritable bowel syndrome or diverticulitis.   Review of Systems   Constitutional:  Negative for appetite change, chills, fatigue, fever, unexpected weight change and weight loss.   HENT:  Negative for trouble swallowing.    Respiratory:  Negative for cough and shortness of breath.    Cardiovascular:  Negative for chest pain.   Gastrointestinal:  Negative for abdominal distention, abdominal pain, anal bleeding, anorexia, bloating, blood in stool, constipation, diarrhea, dysphagia, hematemesis, hematochezia, jaundice, melena, nausea, rectal pain and vomiting.   Genitourinary:  Negative for difficulty urinating and dysuria.   Musculoskeletal:  Negative for arthralgias, gait problem and myalgias.   Skin:  Negative for rash.   Neurological:  Negative for speech difficulty.   Psychiatric/Behavioral:  Negative  for confusion.      Past Medical History:   Diagnosis Date    Anticoagulant long-term use     Aspirin    Chronic back pain     GERD (gastroesophageal reflux disease)     H/O seasonal allergies     Hypercholesteremia     Hypertension     Lumbar stenosis       Past Surgical History:   Procedure Laterality Date    CHOLECYSTECTOMY      COLONOSCOPY N/A 02/01/2016    Procedure: COLONOSCOPY;  Surgeon: Fitz Lowe MD;  Location: [facility];  Service: Endoscopy;  Laterality: N/A;    COSMETIC SURGERY  2010    facial near left eye and nose left side, due to injury    EYE SURGERY  01/2016    piece of metal removed OS,     TONSILLECTOMY        Family History   Problem Relation Age of Onset    Parkinsonism Mother     Hypertension Mother     Lung cancer Father     Colon cancer Neg Hx       Wt Readings from Last 10 Encounters:   10/31/22 85.1 kg (187 lb 9.8 oz)   07/20/18 83.1 kg (183 lb 3.2 oz)   09/13/17 80.3 kg (176 lb 14.7 oz)   08/16/17 80.5 kg (177 lb 7.5 oz)   08/01/17 79.2 kg (174 lb 9.7 oz)   02/08/17 81.6 kg (180 lb)   10/06/16 82 kg (180 lb 12.4 oz)   08/17/16 82.1 kg (181 lb)   07/25/16 83.9 kg (185 lb)   01/28/16 81.6 kg (180 lb)     Lab Results   Component Value Date    WBC 8.80 08/18/2017    HGB 8.8 (L) 08/18/2017    HCT 26.8 (L) 08/18/2017    MCV 93 08/18/2017     08/18/2017     CMP  Sodium   Date Value Ref Range Status   08/18/2017 140 136 - 145 mmol/L Final     Potassium   Date Value Ref Range Status   08/18/2017 3.5 3.5 - 5.1 mmol/L Final     Chloride   Date Value Ref Range Status   08/18/2017 103 95 - 110 mmol/L Final     CO2   Date Value Ref Range Status   08/18/2017 28 22 - 31 mmol/L Final     Glucose   Date Value Ref Range Status   08/18/2017 112 (H) 70 - 110 mg/dL Final     Comment:     The ADA recommends the following guidelines for fasting glucose:  Normal:       less than 100 mg/dL  Prediabetes:  100 mg/dL to 125 mg/dL  Diabetes:     126 mg/dL or higher       BUN   Date Value Ref Range  Status   08/18/2017 9 9 - 21 mg/dL Final     Creatinine   Date Value Ref Range Status   08/18/2017 0.97 0.50 - 1.40 mg/dL Final     Calcium   Date Value Ref Range Status   08/18/2017 9.4 8.4 - 10.2 mg/dL Final     Anion Gap   Date Value Ref Range Status   08/18/2017 9 8 - 16 mmol/L Final     eGFR if    Date Value Ref Range Status   08/18/2017 >60 >60 mL/min/1.73 m^2 Final     eGFR if non    Date Value Ref Range Status   08/18/2017 >60 >60 mL/min/1.73 m^2 Final     Comment:     Calculation used to obtain the estimated glomerular filtration  rate (eGFR) is the CKD-EPI equation. Since race is unknown   in our information system, the eGFR values for   -American and Non--American patients are given   for each creatinine result.                Reviewed prior medical records including endoscopy history (see surgical history).     Objective:      Physical Exam  Constitutional:       General: He is not in acute distress.     Appearance: He is well-developed.   HENT:      Head: Normocephalic.      Right Ear: Hearing normal.      Left Ear: Hearing normal.      Nose: Nose normal.      Mouth/Throat:      Mouth: No oral lesions.      Pharynx: Uvula midline.   Eyes:      General: Lids are normal.      Conjunctiva/sclera: Conjunctivae normal.      Pupils: Pupils are equal, round, and reactive to light.   Neck:      Trachea: Trachea normal.   Cardiovascular:      Rate and Rhythm: Normal rate and regular rhythm.      Heart sounds: Normal heart sounds. No murmur heard.  Pulmonary:      Effort: Pulmonary effort is normal. No respiratory distress.      Breath sounds: Normal breath sounds. No stridor. No wheezing.   Abdominal:      General: Bowel sounds are normal. There is no distension.      Palpations: Abdomen is soft. There is no mass.      Tenderness: There is no abdominal tenderness. There is no guarding or rebound.   Musculoskeletal:         General: Normal range of motion.       Cervical back: Normal range of motion.   Skin:     General: Skin is warm and dry.      Findings: No rash.      Comments: Non jaundiced   Neurological:      Mental Status: He is alert and oriented to person, place, and time.   Psychiatric:         Speech: Speech normal.         Behavior: Behavior normal. Behavior is cooperative.         Assessment:       1. History of colon polyps    2. History of gastroesophageal reflux (GERD)    3. S/P cholecystectomy           Plan:   All diagnoses and orders for this visit:    History of colon polyps   - Schedule Colonoscopy     History of gastroesophageal reflux (GERD)   - Continue Famotidine 40 mg once daily PRN  - Recommend to avoid large meals, avoid eating within 3 hours of bedtime, elevate head of bed if nocturnal symptoms are present, smoking cessation (if current smoker), & weight loss (if overweight).   - Recommend minimize/avoid high-fat foods, chocolate, caffeine, citrus, alcohol, & tomato products.  - Advised to avoid/limit use of NSAID's, since they can cause GI upset, bleeding, and/or ulcers. If needed, take with food.      S/P cholecystectomy    If no improvement in symptoms or symptoms worsen, call/follow-up at clinic or go to ER

## 2023-01-17 ENCOUNTER — ANESTHESIA (OUTPATIENT)
Dept: ENDOSCOPY | Facility: HOSPITAL | Age: 73
End: 2023-01-17
Payer: OTHER GOVERNMENT

## 2023-01-17 ENCOUNTER — ANESTHESIA EVENT (OUTPATIENT)
Dept: ENDOSCOPY | Facility: HOSPITAL | Age: 73
End: 2023-01-17
Payer: OTHER GOVERNMENT

## 2023-01-17 ENCOUNTER — HOSPITAL ENCOUNTER (OUTPATIENT)
Facility: HOSPITAL | Age: 73
Discharge: HOME | End: 2023-01-17
Attending: INTERNAL MEDICINE | Admitting: INTERNAL MEDICINE
Payer: OTHER GOVERNMENT

## 2023-01-17 DIAGNOSIS — Z86.010 HX OF COLONIC POLYPS: ICD-10-CM

## 2023-01-17 LAB — GLUCOSE SERPL-MCNC: 127 MG/DL (ref 70–110)

## 2023-01-17 PROCEDURE — 37000008 HC ANESTHESIA 1ST 15 MINUTES: Mod: PO | Performed by: INTERNAL MEDICINE

## 2023-01-17 PROCEDURE — 25000003 PHARM REV CODE 250: Mod: PO | Performed by: NURSE ANESTHETIST, CERTIFIED REGISTERED

## 2023-01-17 PROCEDURE — 88305 TISSUE EXAM BY PATHOLOGIST: CPT | Performed by: STUDENT IN AN ORGANIZED HEALTH CARE EDUCATION/TRAINING PROGRAM

## 2023-01-17 PROCEDURE — 45385 PR COLONOSCOPY,REMV LESN,SNARE: ICD-10-PCS | Mod: 33,,, | Performed by: INTERNAL MEDICINE

## 2023-01-17 PROCEDURE — 63600175 PHARM REV CODE 636 W HCPCS: Mod: PO | Performed by: INTERNAL MEDICINE

## 2023-01-17 PROCEDURE — 88305 TISSUE EXAM BY PATHOLOGIST: ICD-10-PCS | Mod: 26,,, | Performed by: STUDENT IN AN ORGANIZED HEALTH CARE EDUCATION/TRAINING PROGRAM

## 2023-01-17 PROCEDURE — D9220A PRA ANESTHESIA: Mod: PT,ANES,, | Performed by: ANESTHESIOLOGY

## 2023-01-17 PROCEDURE — 45385 COLONOSCOPY W/LESION REMOVAL: CPT | Mod: PT,PO | Performed by: INTERNAL MEDICINE

## 2023-01-17 PROCEDURE — D9220A PRA ANESTHESIA: Mod: PT,CRNA,, | Performed by: NURSE ANESTHETIST, CERTIFIED REGISTERED

## 2023-01-17 PROCEDURE — 63600175 PHARM REV CODE 636 W HCPCS: Mod: PO | Performed by: NURSE ANESTHETIST, CERTIFIED REGISTERED

## 2023-01-17 PROCEDURE — 82962 GLUCOSE BLOOD TEST: CPT | Mod: PO | Performed by: INTERNAL MEDICINE

## 2023-01-17 PROCEDURE — D9220A PRA ANESTHESIA: ICD-10-PCS | Mod: PT,CRNA,, | Performed by: NURSE ANESTHETIST, CERTIFIED REGISTERED

## 2023-01-17 PROCEDURE — 27201089 HC SNARE, DISP (ANY): Mod: PO | Performed by: INTERNAL MEDICINE

## 2023-01-17 PROCEDURE — D9220A PRA ANESTHESIA: ICD-10-PCS | Mod: PT,ANES,, | Performed by: ANESTHESIOLOGY

## 2023-01-17 PROCEDURE — 88305 TISSUE EXAM BY PATHOLOGIST: CPT | Mod: 26,,, | Performed by: STUDENT IN AN ORGANIZED HEALTH CARE EDUCATION/TRAINING PROGRAM

## 2023-01-17 PROCEDURE — 37000009 HC ANESTHESIA EA ADD 15 MINS: Mod: PO | Performed by: INTERNAL MEDICINE

## 2023-01-17 PROCEDURE — 45385 COLONOSCOPY W/LESION REMOVAL: CPT | Mod: 33,,, | Performed by: INTERNAL MEDICINE

## 2023-01-17 RX ORDER — PROPOFOL 10 MG/ML
VIAL (ML) INTRAVENOUS
Status: DISCONTINUED | OUTPATIENT
Start: 2023-01-17 | End: 2023-01-17

## 2023-01-17 RX ORDER — SODIUM CHLORIDE, SODIUM LACTATE, POTASSIUM CHLORIDE, CALCIUM CHLORIDE 600; 310; 30; 20 MG/100ML; MG/100ML; MG/100ML; MG/100ML
INJECTION, SOLUTION INTRAVENOUS CONTINUOUS
Status: DISCONTINUED | OUTPATIENT
Start: 2023-01-17 | End: 2023-01-17 | Stop reason: HOSPADM

## 2023-01-17 RX ORDER — SODIUM CHLORIDE 0.9 % (FLUSH) 0.9 %
10 SYRINGE (ML) INJECTION
Status: DISCONTINUED | OUTPATIENT
Start: 2023-01-17 | End: 2023-01-17 | Stop reason: HOSPADM

## 2023-01-17 RX ORDER — LIDOCAINE HYDROCHLORIDE 20 MG/ML
INJECTION INTRAVENOUS
Status: DISCONTINUED | OUTPATIENT
Start: 2023-01-17 | End: 2023-01-17

## 2023-01-17 RX ADMIN — SODIUM CHLORIDE, POTASSIUM CHLORIDE, SODIUM LACTATE AND CALCIUM CHLORIDE: 600; 310; 30; 20 INJECTION, SOLUTION INTRAVENOUS at 10:01

## 2023-01-17 RX ADMIN — PROPOFOL 40 MG: 10 INJECTION, EMULSION INTRAVENOUS at 10:01

## 2023-01-17 RX ADMIN — PROPOFOL 120 MG: 10 INJECTION, EMULSION INTRAVENOUS at 10:01

## 2023-01-17 RX ADMIN — LIDOCAINE HYDROCHLORIDE 75 MG: 20 INJECTION INTRAVENOUS at 10:01

## 2023-01-17 RX ADMIN — PROPOFOL 40 MG: 10 INJECTION, EMULSION INTRAVENOUS at 11:01

## 2023-01-17 NOTE — PROVATION PATIENT INSTRUCTIONS
Discharge Summary/Instructions after an Endoscopic Procedure  Patient Name: Tian Magana  Patient MRN: [id number]  Patient YOB: 1950  Tuesday, January 17, 2023  Fitz Lowe MD  Dear patient,  As a result of recent federal legislation (The Federal Cures Act), you may   receive lab or pathology results from your procedure in your [facility]   account before your physician is able to contact you. Your physician or   their representative will relay the results to you with their   recommendations at their soonest availability.  Thank you,  RESTRICTIONS:  During your procedure today, you received medications for sedation.  These   medications may affect your judgment, balance and coordination.  Therefore,   for 24 hours, you have the following restrictions:   - DO NOT drive a car, operate machinery, make legal/financial decisions,   sign important papers or drink alcohol.    ACTIVITY:  Today: no heavy lifting, straining or running due to procedural   sedation/anesthesia.  The following day: return to full activity including work.  DIET:  Eat and drink normally unless instructed otherwise.     TREATMENT FOR COMMON SIDE EFFECTS:  - Mild abdominal pain, nausea, belching, bloating or excessive gas:  rest,   eat lightly and use a heating pad.  - Sore Throat: treat with throat lozenges and/or gargle with warm salt   water.  - Because air was used during the procedure, expelling large amounts of air   from your rectum or belching is normal.  - If a bowel prep was taken, you may not have a bowel movement for 1-3 days.    This is normal.  SYMPTOMS TO WATCH FOR AND REPORT TO YOUR PHYSICIAN:  1. Abdominal pain or bloating, other than gas cramps.  2. Chest pain.  3. Back pain.  4. Signs of infection such as: chills or fever occurring within 24 hours   after the procedure.  5. Rectal bleeding, which would show as bright red, maroon, or black stools.   (A tablespoon of blood from the rectum is not serious,  especially if   hemorrhoids are present.)  6. Vomiting.  7. Weakness or dizziness.  GO DIRECTLY TO THE NEAREST EMERGENCY ROOM IF YOU HAVE ANY OF THE FOLLOWING:      Difficulty breathing              Chills and/or fever over 101 F   Persistent vomiting and/or vomiting blood   Severe abdominal pain   Severe chest pain   Black, tarry stools   Bleeding- more than one tablespoon   Any other symptom or condition that you feel may need urgent attention  Your doctor recommends these additional instructions:  If any biopsies were taken, your doctors clinic will contact you in 1 to 2   weeks with any results.  We are waiting for your pathology results.   Your physician has recommended a repeat colonoscopy in five years for   surveillance based on pathology results.   You are being discharged to home.  For questions, problems or results please call your physician - Fitz Lowe MD at Work:  [phone].  EMERGENCY PHONE NUMBER: [phone], LAB RESULTS: [phone]  IF A COMPLICATION OR EMERGENCY SITUATION ARISES AND YOU ARE UNABLE TO REACH   YOUR PHYSICIAN - GO DIRECTLY TO THE EMERGENCY ROOM.  ___________________________________________  Nurse Signature  ___________________________________________  Patient/Designated Responsible Party Signature  Fitz Lowe MD  1/17/2023 11:06:00 AM  This report has been verified and signed electronically.  Dear patient,  As a result of recent federal legislation (The Federal Cures Act), you may   receive lab or pathology results from your procedure in your [facility]   account before your physician is able to contact you. Your physician or   their representative will relay the results to you with their   recommendations at their soonest availability.  Thank you.  PROVATION

## 2023-01-17 NOTE — DISCHARGE SUMMARY
[facility] - Endoscopy  Discharge Note  Short Stay  Discharge Note  Short Stay      SUMMARY     Admit Date: 1/17/2023    Attending Physician: Fitz Lowe MD     Discharge Physician: Fitz Lowe MD    Discharge Date: 1/17/2023 11:07 AM    Final Diagnosis: History of colon polyps [Z86.010]    Disposition: HOME OR SELF CARE    Patient Instructions:   Current Discharge Medication List        CONTINUE these medications which have NOT CHANGED    Details   ascorbic acid (VITAMIN C) 100 MG tablet Take 100 mg by mouth once daily.      aspirin (ECOTRIN) 81 MG EC tablet Take 81 mg by mouth once daily.      atorvastatin (LIPITOR) 40 MG tablet Take 40 mg by mouth once daily.      calcium carbonate (OS-MENG) 500 mg calcium (1,250 mg) tablet 1,250 mg.      calcium-vitamin D (OSCAL) 250 (625)-125 mg-unit per tablet Take 1 tablet by mouth once daily.      carboxymethylcellulose (REFRESH PLUS) 0.5 % Dpet INSTILL 1 DROP IN EACH EYE TWICE A DAY FOR DRY EYES      clonazePAM (KLONOPIN) 0.5 MG tablet Take 0.5 mg by mouth 2 (two) times daily.      cyanocobalamin (VITAMIN B-12) 1000 MCG tablet 1,000 mcg.      dextran 70-hypromellose (TEARS) ophthalmic solution Artificial Tears (dextran 70-hypromellose) 0.1 %-0.3 % eye drops   1 drop each eye 4 x day      ergocalciferol (ERGOCALCIFEROL) 50,000 unit Cap Take 50,000 Units by mouth every 7 days.      famotidine (PEPCID) 40 MG tablet 40 mg.      fluticasone propionate (FLONASE) 50 mcg/actuation nasal spray Allergy Relief (fluticasone) 50 mcg/actuation nasal spray,suspension   Spray 2 sprays every day by intranasal route.      gabapentin (NEURONTIN) 300 MG capsule Take 300 mg by mouth 3 (three) times daily as needed.       hydrOXYzine HCl (ATARAX) 10 MG Tab Take 25 mg by mouth every evening.      lisinopril (PRINIVIL,ZESTRIL) 40 MG tablet Take 40 mg by mouth once daily.      loratadine (CLARITIN) 10 mg tablet Take 10 mg by mouth daily as needed.       magnesium oxide 420 mg Tab  magnesium oxide 420 mg tablet   Take 1 tablet every day by oral route.      metFORMIN (GLUCOPHAGE-XR) 500 MG ER 24hr tablet Take 1 tablet by mouth once daily.      omega 3-dha-epa-fish oil (FISH OIL) 100-160-1,000 mg Cap Fish Oil      omega-3 fatty acids-vitamin E 1,000 mg Cap Take 1 capsule by mouth once daily.       oxycodone-acetaminophen (PERCOCET) 7.5-325 mg per tablet Take 1 tablet by mouth every 4 to 6 hours as needed.  Qty: 61 tablet, Refills: 0      rosuvastatin (CRESTOR) 40 MG Tab 40 mg.      sertraline (ZOLOFT) 100 MG tablet Take 100 mg by mouth once daily.      simethicone (MYLICON) 80 MG chewable tablet Take 80 mg by mouth every 6 (six) hours as needed for Flatulence.      trazodone (DESYREL) 100 MG tablet Take 100 mg by mouth every evening.      acetaminophen (TYLENOL) 325 MG tablet Take 325 mg by mouth every 6 (six) hours as needed for Pain.      camphor-menthol 0.5-0.5% (SARNA) lotion camphor-menthol 0.5 %-0.5 % lotion   apply twice daily      lidocaine (LIDODERM) 5 % Place 1 patch onto the skin every 24 hours. Remove & Discard patch within 12 hours or as directed by MD      LIDOcaine-methyl sal-menthol 4-4-5 % PtMd lidocaine 4 %-methyl salicylate 4 %-menthol 5 % topical patch   Apply1 patch daily      sildenafil (VIAGRA) 100 MG tablet Take 100 mg by mouth daily as needed for Erectile Dysfunction.             Discharge Procedure Orders (must include Diet, Follow-up, Activity)    Follow Up:  Follow up with PCP as previously scheduled  Resume routine diet.  Activity as tolerated.    No driving day of procedure.

## 2023-01-17 NOTE — ANESTHESIA PREPROCEDURE EVALUATION
01/17/2023  Tian Magana is a 72 y.o., male.    Pre-op Assessment       I have reviewed the Medications.     Review of Systems  Anesthesia Hx:  No problems with previous Anesthesia   Social:  Non-Smoker, No Alcohol Use    Cardiovascular:   Hypertension hyperlipidemia    Pulmonary:  Pulmonary Normal    Renal/:  Renal/ Normal     Hepatic/GI:  Hepatic/GI Normal    Neurological:  Neurology Normal    Endocrine:  Endocrine Normal        Physical Exam  General:  Well nourished      Airway/Jaw/Neck:  Airway Findings: Mouth Opening: Normal   General Airway Assessment: Adult Mallampati: II  Jaw/Neck Findings:  Neck ROM: Extension Decreased, Mild        Chest/Lungs:  Chest/Lungs Findings: Clear to auscultation, Normal Respiratory Rate      Heart/Vascular:  Heart Findings: Rate: Normal  Rhythm: Regular Rhythm  Sounds: Normal  Heart murmur: negative Vascular Findings: Normal (No carotid bruits.)       Mental Status:  Mental Status Findings:  Cooperative, Alert and Oriented         Anesthesia Plan  Type of Anesthesia, risks & benefits discussed:  Anesthesia Type:  Gen Natural Airway    Patient's Preference:   Plan Factors:          Intra-op Monitoring Plan: Standard ASA Monitors  Intra-op Monitoring Plan Comments:   Post Op Pain Control Plan:   Post Op Pain Control Plan Comments:     Induction:   IV  Beta Blocker:  Patient is not currently on a Beta-Blocker (No further documentation required).       Informed Consent: Informed consent signed with the Patient and all parties understand the risks and agree with anesthesia plan.  All questions answered.  Anesthesia consent signed with patient.  ASA Score: 2     Day of Surgery Review of History & Physical:    H&P Update referred to the surgeon/provider.          Ready For Surgery From Anesthesia Perspective.           Physical Exam  General: Well  nourished    Airway:  Mallampati: II   Mouth Opening: Normal  Neck ROM: Extension Decreased, Mild    Chest/Lungs:  Clear to auscultation, Normal Respiratory Rate    Heart:  Rate: Normal  Rhythm: Regular Rhythm  Sounds: Normal          Anesthesia Plan  Type of Anesthesia, risks & benefits discussed:    Anesthesia Type: Gen Natural Airway  Intra-op Monitoring Plan: Standard ASA Monitors  Induction:  IV  Informed Consent: Informed consent signed with the Patient and all parties understand the risks and agree with anesthesia plan.  All questions answered.   ASA Score: 2  Day of Surgery Review of History & Physical: H&P Update referred to the surgeon/provider.    Ready For Surgery From Anesthesia Perspective.       .

## 2023-01-17 NOTE — TRANSFER OF CARE
"Anesthesia Transfer of Care Note    Patient: Tian HU Plescia    Procedure(s) Performed: Procedure(s) (LRB):  COLONOSCOPY (N/A)    Patient location: PACU    Anesthesia Type: general    Transport from OR: Transported from OR on room air with adequate spontaneous ventilation    Post pain: adequate analgesia    Post assessment: no apparent anesthetic complications    Post vital signs: stable    Level of consciousness: awake and sedated    Nausea/Vomiting: no nausea/vomiting    Complications: none    Transfer of care protocol was followed      Last vitals:   Visit Vitals  BP (!) 147/75   Pulse 72   Temp 36.8 °C (98.2 °F)   Resp 16   Ht 5' 6" (1.676 m)   Wt 81.6 kg (180 lb)   SpO2 97%   BMI 29.05 kg/m²     "

## 2023-01-17 NOTE — H&P
History & Physical - Short Stay  Gastroenterology      SUBJECTIVE:     Procedure: Colonoscopy    Chief Complaint/Indication for Procedure: Previous Polyps    PTA Medications   Medication Sig    ascorbic acid (VITAMIN C) 100 MG tablet Take 100 mg by mouth once daily.    aspirin (ECOTRIN) 81 MG EC tablet Take 81 mg by mouth once daily.    atorvastatin (LIPITOR) 40 MG tablet Take 40 mg by mouth once daily.    calcium carbonate (OS-MENG) 500 mg calcium (1,250 mg) tablet 1,250 mg.    calcium-vitamin D (OSCAL) 250 (625)-125 mg-unit per tablet Take 1 tablet by mouth once daily.    carboxymethylcellulose (REFRESH PLUS) 0.5 % Dpet INSTILL 1 DROP IN EACH EYE TWICE A DAY FOR DRY EYES    clonazePAM (KLONOPIN) 0.5 MG tablet Take 0.5 mg by mouth 2 (two) times daily.    cyanocobalamin (VITAMIN B-12) 1000 MCG tablet 1,000 mcg.    dextran 70-hypromellose (TEARS) ophthalmic solution Artificial Tears (dextran 70-hypromellose) 0.1 %-0.3 % eye drops   1 drop each eye 4 x day    ergocalciferol (ERGOCALCIFEROL) 50,000 unit Cap Take 50,000 Units by mouth every 7 days.    famotidine (PEPCID) 40 MG tablet 40 mg.    fluticasone propionate (FLONASE) 50 mcg/actuation nasal spray Allergy Relief (fluticasone) 50 mcg/actuation nasal spray,suspension   Spray 2 sprays every day by intranasal route.    gabapentin (NEURONTIN) 300 MG capsule Take 300 mg by mouth 3 (three) times daily as needed.     hydrOXYzine HCl (ATARAX) 10 MG Tab Take 25 mg by mouth every evening.    lisinopril (PRINIVIL,ZESTRIL) 40 MG tablet Take 40 mg by mouth once daily.    loratadine (CLARITIN) 10 mg tablet Take 10 mg by mouth daily as needed.     magnesium oxide 420 mg Tab magnesium oxide 420 mg tablet   Take 1 tablet every day by oral route.    metFORMIN (GLUCOPHAGE-XR) 500 MG ER 24hr tablet Take 1 tablet by mouth once daily.    omega 3-dha-epa-fish oil (FISH OIL) 100-160-1,000 mg Cap Fish Oil    omega-3 fatty acids-vitamin E 1,000 mg Cap Take 1 capsule by mouth once daily.      oxycodone-acetaminophen (PERCOCET) 7.5-325 mg per tablet Take 1 tablet by mouth every 4 to 6 hours as needed.    rosuvastatin (CRESTOR) 40 MG Tab 40 mg.    sertraline (ZOLOFT) 100 MG tablet Take 100 mg by mouth once daily.    simethicone (MYLICON) 80 MG chewable tablet Take 80 mg by mouth every 6 (six) hours as needed for Flatulence.    trazodone (DESYREL) 100 MG tablet Take 100 mg by mouth every evening.    acetaminophen (TYLENOL) 325 MG tablet Take 325 mg by mouth every 6 (six) hours as needed for Pain.    camphor-menthol 0.5-0.5% (SARNA) lotion camphor-menthol 0.5 %-0.5 % lotion   apply twice daily    lidocaine (LIDODERM) 5 % Place 1 patch onto the skin every 24 hours. Remove & Discard patch within 12 hours or as directed by MD    LIDOcaine-methyl sal-menthol 4-4-5 % PtMd lidocaine 4 %-methyl salicylate 4 %-menthol 5 % topical patch   Apply1 patch daily    sildenafil (VIAGRA) 100 MG tablet Take 100 mg by mouth daily as needed for Erectile Dysfunction.       Review of patient's allergies indicates:   Allergen Reactions    Sulfa (sulfonamide antibiotics)      As a child, told by mother of allergy, unknown reaction        Past Medical History:   Diagnosis Date    Anticoagulant long-term use     Aspirin    Chronic back pain     GERD (gastroesophageal reflux disease)     H/O seasonal allergies     Hypercholesteremia     Hypertension     Lumbar stenosis      Past Surgical History:   Procedure Laterality Date    CHOLECYSTECTOMY      COLONOSCOPY N/A 02/01/2016    Procedure: COLONOSCOPY;  Surgeon: Fitz Lowe MD;  Location: [facility];  Service: Endoscopy;  Laterality: N/A;    COSMETIC SURGERY  2010    facial near left eye and nose left side, due to injury    EYE SURGERY  01/2016    piece of metal removed OS,     TONSILLECTOMY       Family History   Problem Relation Age of Onset    Parkinsonism Mother     Hypertension Mother     Lung cancer Father     Colon cancer Neg Hx      Social History     Tobacco Use     Smoking status: Never    Smokeless tobacco: Never   Substance Use Topics    Alcohol use: Yes     Alcohol/week: 0.0 standard drinks     Comment: 2-3 beers 4 times/week    Drug use: No         OBJECTIVE:     Vital Signs (Most Recent)  Temp: 98.2 °F (36.8 °C) (01/17/23 1017)  Pulse: 72 (01/17/23 1017)  Resp: 16 (01/17/23 1017)  BP: (!) 147/75 (01/17/23 1017)  SpO2: 97 % (01/17/23 1017)    Physical Exam:                                                       GENERAL:  Comfortable, in no acute distress.                                 HEENT EXAM:  Nonicteric.  No adenopathy.  Oropharynx is clear.               NECK:  Supple.                                                               LUNGS:  Clear.                                                               CARDIAC:  Regular rate and rhythm.  S1, S2.  No murmur.                      ABDOMEN:  Soft, positive bowel sounds, nontender.  No hepatosplenomegaly or masses.  No rebound or guarding.                                             EXTREMITIES:  No edema.     MENTAL STATUS:  Normal, alert and oriented.      ASSESSMENT/PLAN:     Assessment: Previous Polyps    Plan: Colonoscopy    Anesthesia Plan: General    ASA Grade: ASA 2 - Patient with mild systemic disease with no functional limitations    MALLAMPATI SCORE:  I (soft palate, uvula, fauces, and tonsillar pillars visible)

## 2023-01-18 VITALS
BODY MASS INDEX: 28.93 KG/M2 | HEIGHT: 66 IN | RESPIRATION RATE: 16 BRPM | SYSTOLIC BLOOD PRESSURE: 90 MMHG | HEART RATE: 62 BPM | WEIGHT: 180 LBS | OXYGEN SATURATION: 98 % | DIASTOLIC BLOOD PRESSURE: 53 MMHG | TEMPERATURE: 98 F

## 2023-01-18 NOTE — ANESTHESIA POSTPROCEDURE EVALUATION
Anesthesia Post Evaluation    Patient: Tian Stanleyscia    Procedure(s) Performed: Procedure(s) (LRB):  COLONOSCOPY (N/A)    Final Anesthesia Type: general      Patient location during evaluation: PACU  Patient participation: Yes- Able to Participate  Level of consciousness: awake and alert  Post-procedure vital signs: reviewed and stable  Pain management: adequate  Airway patency: patent    PONV status at discharge: No PONV  Anesthetic complications: no      Cardiovascular status: blood pressure returned to baseline  Respiratory status: unassisted and room air  Hydration status: euvolemic  Follow-up not needed.          Vitals Value Taken Time   BP 90/53 01/17/23 1120   Temp 36.4 °C (97.5 °F) 01/17/23 1108   Pulse 62 01/17/23 1120   Resp 16 01/17/23 1120   SpO2 98 % 01/17/23 1120         No case tracking events are documented in the log.      Pain/Samy Score: Samy Score: 9 (1/17/2023 11:08 AM)

## 2023-01-20 LAB
FINAL PATHOLOGIC DIAGNOSIS: NORMAL
GROSS: NORMAL
Lab: NORMAL